# Patient Record
Sex: MALE | Race: BLACK OR AFRICAN AMERICAN | NOT HISPANIC OR LATINO | Employment: FULL TIME | URBAN - METROPOLITAN AREA
[De-identification: names, ages, dates, MRNs, and addresses within clinical notes are randomized per-mention and may not be internally consistent; named-entity substitution may affect disease eponyms.]

---

## 2020-02-01 ENCOUNTER — HISTORICAL (OUTPATIENT)
Dept: ADMINISTRATIVE | Facility: HOSPITAL | Age: 25
End: 2020-02-01

## 2020-02-04 LAB — FINAL CULTURE: NORMAL

## 2021-08-07 ENCOUNTER — HOSPITAL ENCOUNTER (EMERGENCY)
Facility: HOSPITAL | Age: 26
Discharge: HOME | End: 2021-08-07
Attending: EMERGENCY MEDICINE
Payer: MEDICAID

## 2021-08-07 VITALS
HEART RATE: 77 BPM | WEIGHT: 157 LBS | BODY MASS INDEX: 26.8 KG/M2 | OXYGEN SATURATION: 98 % | HEIGHT: 64 IN | DIASTOLIC BLOOD PRESSURE: 84 MMHG | RESPIRATION RATE: 16 BRPM | SYSTOLIC BLOOD PRESSURE: 131 MMHG | TEMPERATURE: 99 F

## 2021-08-07 DIAGNOSIS — U07.1 COVID-19 VIRUS DETECTED: ICD-10-CM

## 2021-08-07 DIAGNOSIS — U07.1 COVID-19: Primary | ICD-10-CM

## 2021-08-07 LAB
CTP QC/QA: YES
SARS-COV-2 RDRP RESP QL NAA+PROBE: POSITIVE

## 2021-08-07 PROCEDURE — U0002 COVID-19 LAB TEST NON-CDC: HCPCS | Performed by: EMERGENCY MEDICINE

## 2021-08-07 PROCEDURE — 99285 EMERGENCY DEPT VISIT HI MDM: CPT | Mod: CR,CS,, | Performed by: PHYSICIAN ASSISTANT

## 2021-08-07 PROCEDURE — 99285 PR EMERGENCY DEPT VISIT,LEVEL V: ICD-10-PCS | Mod: CR,CS,, | Performed by: PHYSICIAN ASSISTANT

## 2021-08-07 PROCEDURE — 99283 EMERGENCY DEPT VISIT LOW MDM: CPT

## 2021-08-07 PROCEDURE — 25000003 PHARM REV CODE 250: Performed by: PHYSICIAN ASSISTANT

## 2021-08-07 RX ORDER — DICYCLOMINE HYDROCHLORIDE 10 MG/1
20 CAPSULE ORAL
Status: COMPLETED | OUTPATIENT
Start: 2021-08-07 | End: 2021-08-07

## 2021-08-07 RX ORDER — IBUPROFEN 600 MG/1
600 TABLET ORAL
Status: COMPLETED | OUTPATIENT
Start: 2021-08-07 | End: 2021-08-07

## 2021-08-07 RX ADMIN — DICYCLOMINE HYDROCHLORIDE 20 MG: 10 CAPSULE ORAL at 01:08

## 2021-08-07 RX ADMIN — IBUPROFEN 600 MG: 600 TABLET ORAL at 01:08

## 2022-04-10 ENCOUNTER — HISTORICAL (OUTPATIENT)
Dept: ADMINISTRATIVE | Facility: HOSPITAL | Age: 27
End: 2022-04-10
Payer: MEDICAID

## 2022-04-24 VITALS — SYSTOLIC BLOOD PRESSURE: 132 MMHG | DIASTOLIC BLOOD PRESSURE: 85 MMHG | OXYGEN SATURATION: 98 %

## 2023-03-31 ENCOUNTER — OFFICE VISIT (OUTPATIENT)
Dept: URGENT CARE | Facility: CLINIC | Age: 28
End: 2023-03-31
Payer: MEDICAID

## 2023-03-31 VITALS
SYSTOLIC BLOOD PRESSURE: 148 MMHG | RESPIRATION RATE: 18 BRPM | DIASTOLIC BLOOD PRESSURE: 91 MMHG | HEART RATE: 64 BPM | OXYGEN SATURATION: 97 % | TEMPERATURE: 98 F | WEIGHT: 165 LBS | BODY MASS INDEX: 28.17 KG/M2 | HEIGHT: 64 IN

## 2023-03-31 DIAGNOSIS — H61.23 BILATERAL IMPACTED CERUMEN: ICD-10-CM

## 2023-03-31 DIAGNOSIS — H66.92 LEFT ACUTE OTITIS MEDIA: Primary | ICD-10-CM

## 2023-03-31 PROCEDURE — 99203 OFFICE O/P NEW LOW 30 MIN: CPT | Mod: S$GLB,,, | Performed by: NURSE PRACTITIONER

## 2023-03-31 PROCEDURE — 99203 PR OFFICE/OUTPT VISIT, NEW, LEVL III, 30-44 MIN: ICD-10-PCS | Mod: S$GLB,,, | Performed by: NURSE PRACTITIONER

## 2023-03-31 RX ORDER — AMOXICILLIN AND CLAVULANATE POTASSIUM 875; 125 MG/1; MG/1
1 TABLET, FILM COATED ORAL EVERY 12 HOURS
Qty: 20 TABLET | Refills: 0 | Status: SHIPPED | OUTPATIENT
Start: 2023-03-31 | End: 2023-04-10

## 2023-03-31 NOTE — PROGRESS NOTES
"Subjective:      Patient ID: Tony Murillo is a 27 y.o. male.    Vitals:  height is 5' 4" (1.626 m) and weight is 74.8 kg (165 lb). His temperature is 98.4 °F (36.9 °C). His blood pressure is 148/91 (abnormal) and his pulse is 64. His respiration is 18 and oxygen saturation is 97%.     Chief Complaint: Ear Fullness (Left ear fullness)    26 y/o male presents to  today with c/o left ear pain and fullness x3 days.  Pt reports sinus infection, that has resolved. No recent antibiotic use. Has decreased hearing.     Ear Fullness   There is pain in the left ear. This is a new problem. The current episode started in the past 7 days. The problem has been gradually worsening. There has been no fever. The pain is at a severity of 0/10. The patient is experiencing no pain. Associated symptoms include hearing loss. Pertinent negatives include no ear discharge. He has tried nothing for the symptoms. The treatment provided no relief.     HENT:  Positive for ear pain and hearing loss. Negative for ear discharge.     Objective:     Physical Exam   Constitutional: He is oriented to person, place, and time.  Non-toxic appearance. He does not appear ill. No distress.   HENT:   Head: Normocephalic.   Ears:   Right Ear: impacted cerumen  Left Ear: impacted cerumen  Nose: Nose normal.   Mouth/Throat: Mucous membranes are moist.   Cardiovascular: Normal rate.   Pulmonary/Chest: Effort normal.   Abdominal: Normal appearance.   Musculoskeletal: Normal range of motion.         General: Normal range of motion.   Neurological: He is alert and oriented to person, place, and time.   Skin: Skin is warm, dry and not diaphoretic.   Psychiatric: His behavior is normal. Mood normal.   Nursing note and vitals reviewed.    Assessment:     1. Left acute otitis media    2. Bilateral impacted cerumen        Plan:     After bilateral ear wash, left TM is noted to be erythematous and and bulging. Will treat for AOM.    Left acute otitis media  -     " amoxicillin-clavulanate 875-125mg (AUGMENTIN) 875-125 mg per tablet; Take 1 tablet by mouth every 12 (twelve) hours. for 10 days  Dispense: 20 tablet; Refill: 0    Bilateral impacted cerumen  -     Ear wax removal

## 2023-03-31 NOTE — LETTER
March 31, 2023      [facility]  [address] [address]  Phone: [phone]  Fax: [phone]       Patient: Tony Murillo   YOB: 1995  Date of Visit: 03/31/2023    To Whom It May Concern:    Kira Murillo  was at [facility] on 03/31/2023. The patient may return to work/school on 3/31/23 with no restrictions. If you have any questions or concerns, or if I can be of further assistance, please do not hesitate to contact me.    Sincerely,    Patsy Wisdom, RUBY-BC

## 2023-05-17 ENCOUNTER — TELEPHONE (OUTPATIENT)
Dept: URGENT CARE | Facility: CLINIC | Age: 28
End: 2023-05-17

## 2023-05-17 ENCOUNTER — OFFICE VISIT (OUTPATIENT)
Dept: URGENT CARE | Facility: CLINIC | Age: 28
End: 2023-05-17
Payer: MEDICAID

## 2023-05-17 VITALS
SYSTOLIC BLOOD PRESSURE: 121 MMHG | BODY MASS INDEX: 28.17 KG/M2 | HEART RATE: 82 BPM | WEIGHT: 165 LBS | OXYGEN SATURATION: 97 % | HEIGHT: 64 IN | TEMPERATURE: 98 F | RESPIRATION RATE: 19 BRPM | DIASTOLIC BLOOD PRESSURE: 74 MMHG

## 2023-05-17 DIAGNOSIS — J02.9 SORE THROAT: Primary | ICD-10-CM

## 2023-05-17 LAB
CTP QC/QA: YES
CTP QC/QA: YES
MOLECULAR STREP A: NEGATIVE
SARS-COV-2 AG RESP QL IA.RAPID: NEGATIVE

## 2023-05-17 PROCEDURE — 87811 SARS CORONAVIRUS 2 ANTIGEN POCT, MANUAL READ: ICD-10-PCS | Mod: QW,S$GLB,,

## 2023-05-17 PROCEDURE — 87651 POCT STREP A MOLECULAR: ICD-10-PCS | Mod: QW,S$GLB,,

## 2023-05-17 PROCEDURE — 87651 STREP A DNA AMP PROBE: CPT | Mod: QW,S$GLB,,

## 2023-05-17 PROCEDURE — 99213 OFFICE O/P EST LOW 20 MIN: CPT | Mod: S$GLB,,,

## 2023-05-17 PROCEDURE — 87811 SARS-COV-2 COVID19 W/OPTIC: CPT | Mod: QW,S$GLB,,

## 2023-05-17 PROCEDURE — 99213 PR OFFICE/OUTPT VISIT, EST, LEVL III, 20-29 MIN: ICD-10-PCS | Mod: S$GLB,,,

## 2023-05-17 RX ORDER — LIDOCAINE HYDROCHLORIDE 20 MG/ML
SOLUTION OROPHARYNGEAL
Qty: 100 ML | Refills: 0 | Status: SHIPPED | OUTPATIENT
Start: 2023-05-17

## 2023-05-17 NOTE — PATIENT INSTRUCTIONS
SORE THROAT:    You may gargle with hot salt water 4 times a day for the next 2 days and then you may also gargle diluted hydrogen peroxide once to twice daily to alleviate some of your throat discomfort.  Drink plenty of fluids, recommend warm tea with honey.     YOU MAY USE OVER-THE-COUNTER CEPACOL FOR SOOTHING OF YOUR THROAT.  You may wish to avoid spicy food, citrus fruits, and red sauces- as this may irritate the throat more.    You can also take a daily anti-histamine such as Zyrtec, Claritin, Xyzal, OR Allegra-IN DAYTIME; NON DROWSY) AND/OR Benadryl- AT NIGHT; DROWSY) to help with runny nose/sneezing/sore throat/cough.    If you are still having a sore throat in the next 3-5 days return to the clinic for a mono test.    If your symptoms do not improve, you should return to this clinic. If your symptoms worsen, go to the emergency room.

## 2023-05-17 NOTE — PROGRESS NOTES
"Subjective:      Patient ID: Tony Murillo is a 28 y.o. male.    Vitals:  height is 5' 4" (1.626 m) and weight is 74.8 kg (165 lb). His temperature is 98.2 °F (36.8 °C). His blood pressure is 121/74 and his pulse is 82. His respiration is 19 and oxygen saturation is 97%.     Chief Complaint: Sore Throat    29yo malet pt presents to the clinic with symptoms of sore throat, chills, body aches x2 days. Pt denies fever, cough, shortness of breath, wheezing or dizziness.    Sore Throat   This is a new problem. The current episode started in the past 7 days. The problem has been gradually worsening. There has been no fever. The pain is at a severity of 10/10 (Hurts to eat or Drink with his throat pain). The pain is severe. Pertinent negatives include no abdominal pain, ear pain or vomiting. He has had no exposure to strep or mono. He has tried nothing for the symptoms.     HENT:  Positive for sore throat. Negative for ear pain.    Gastrointestinal:  Negative for abdominal pain and vomiting.    Objective:     Physical Exam   Constitutional: He is oriented to person, place, and time. He appears well-developed. He is cooperative.  Non-toxic appearance. He does not appear ill. No distress.   HENT:   Head: Normocephalic and atraumatic.   Ears:   Right Ear: Hearing, tympanic membrane, external ear and ear canal normal.   Left Ear: Hearing, tympanic membrane, external ear and ear canal normal.   Nose: Nose normal. No mucosal edema, rhinorrhea or nasal deformity. No epistaxis. Right sinus exhibits no maxillary sinus tenderness and no frontal sinus tenderness. Left sinus exhibits no maxillary sinus tenderness and no frontal sinus tenderness.   Mouth/Throat: Uvula is midline and mucous membranes are normal. No trismus in the jaw. Normal dentition. No uvula swelling. Posterior oropharyngeal erythema present. No oropharyngeal exudate or posterior oropharyngeal edema. Tonsils are 2+ on the right. Tonsils are 2+ on the left. "   Eyes: Conjunctivae and lids are normal.   Neck: Trachea normal and phonation normal. Neck supple. No edema present. No erythema present. No neck rigidity present.   Cardiovascular: Normal rate, regular rhythm, normal heart sounds and normal pulses.   Pulmonary/Chest: Effort normal and breath sounds normal. No respiratory distress. He has no decreased breath sounds. He has no wheezes. He has no rhonchi.   Abdominal: Normal appearance.   Musculoskeletal: Normal range of motion.         General: Normal range of motion.   Neurological: He is alert and oriented to person, place, and time. He exhibits normal muscle tone.   Skin: Skin is warm, dry, intact and not diaphoretic.   Psychiatric: His speech is normal and behavior is normal. Judgment and thought content normal.   Nursing note and vitals reviewed.  Results for orders placed or performed in visit on 05/17/23   POCT Strep A, Molecular   Result Value Ref Range    Molecular Strep A, POC Negative Negative     Acceptable Yes    SARS Coronavirus 2 Antigen, POCT Manual Read   Result Value Ref Range    SARS Coronavirus 2 Antigen Negative Negative     Acceptable Yes        Assessment:     1. Sore throat        Plan:       Sore throat  -     POCT Strep A, Molecular  -     SARS Coronavirus 2 Antigen, POCT Manual Read  -     (Magic mouthwash) 1:1:1 diphenhydrAMINE(Benadryl) 12.5mg/5ml liq, aluminum & magnesium hydroxide-simethicone (Maalox), LIDOcaine viscous 2%; Swish and spit 10 mLs every 4 (four) hours as needed (sore throat). for mouth sores  Dispense: 90 mL; Refill: 0    Pt in no acute distress. Vitals reassuring. Reviewed negative COVID and strep test results in detail. Discussed treatment with OTC medications and prescription sent for magic mouthwash. Discussed the importance of further evaluation if symptoms worsen. Patient stated verbal understanding.    Patient Instructions   SORE THROAT:    You may gargle with hot salt water 4 times a  day for the next 2 days and then you may also gargle diluted hydrogen peroxide once to twice daily to alleviate some of your throat discomfort.  Drink plenty of fluids, recommend warm tea with honey.     YOU MAY USE OVER-THE-COUNTER CEPACOL FOR SOOTHING OF YOUR THROAT.  You may wish to avoid spicy food, citrus fruits, and red sauces- as this may irritate the throat more.    You can also take a daily anti-histamine such as Zyrtec, Claritin, Xyzal, OR Allegra-IN DAYTIME; NON DROWSY) AND/OR Benadryl- AT NIGHT; DROWSY) to help with runny nose/sneezing/sore throat/cough.    If you are still having a sore throat in the next 3-5 days return to the clinic for a mono test.    If your symptoms do not improve, you should return to this clinic. If your symptoms worsen, go to the emergency room.